# Patient Record
Sex: FEMALE | Race: AMERICAN INDIAN OR ALASKA NATIVE | NOT HISPANIC OR LATINO | Employment: UNEMPLOYED | URBAN - METROPOLITAN AREA
[De-identification: names, ages, dates, MRNs, and addresses within clinical notes are randomized per-mention and may not be internally consistent; named-entity substitution may affect disease eponyms.]

---

## 2018-11-08 ENCOUNTER — OFFICE VISIT (OUTPATIENT)
Dept: CARDIOLOGY | Facility: MEDICAL CENTER | Age: 48
End: 2018-11-08
Payer: MEDICAID

## 2018-11-08 VITALS
HEART RATE: 114 BPM | BODY MASS INDEX: 28.32 KG/M2 | HEIGHT: 65 IN | DIASTOLIC BLOOD PRESSURE: 70 MMHG | SYSTOLIC BLOOD PRESSURE: 118 MMHG | OXYGEN SATURATION: 98 % | WEIGHT: 170 LBS

## 2018-11-08 DIAGNOSIS — R06.02 SHORTNESS OF BREATH: ICD-10-CM

## 2018-11-08 DIAGNOSIS — I50.9 CONGESTIVE HEART FAILURE, UNSPECIFIED HF CHRONICITY, UNSPECIFIED HEART FAILURE TYPE (HCC): ICD-10-CM

## 2018-11-08 DIAGNOSIS — I10 ESSENTIAL HYPERTENSION, BENIGN: ICD-10-CM

## 2018-11-08 DIAGNOSIS — I50.20 ACC/AHA STAGE C SYSTOLIC HEART FAILURE (HCC): ICD-10-CM

## 2018-11-08 LAB — EKG IMPRESSION: NORMAL

## 2018-11-08 PROCEDURE — 99204 OFFICE O/P NEW MOD 45 MIN: CPT | Mod: 25 | Performed by: INTERNAL MEDICINE

## 2018-11-08 PROCEDURE — 93000 ELECTROCARDIOGRAM COMPLETE: CPT | Performed by: INTERNAL MEDICINE

## 2018-11-08 RX ORDER — METOPROLOL SUCCINATE 50 MG/1
50 TABLET, EXTENDED RELEASE ORAL DAILY
COMMUNITY
End: 2018-11-14 | Stop reason: CLARIF

## 2018-11-08 RX ORDER — FUROSEMIDE 40 MG/1
20 TABLET ORAL DAILY
COMMUNITY
Start: 2018-10-30 | End: 2019-01-25 | Stop reason: SDUPTHER

## 2018-11-08 RX ORDER — METOPROLOL SUCCINATE 50 MG/1
75 TABLET, EXTENDED RELEASE ORAL DAILY
Qty: 45 TAB | Refills: 11 | Status: SHIPPED | OUTPATIENT
Start: 2018-11-08 | End: 2018-11-14 | Stop reason: CLARIF

## 2018-11-08 RX ORDER — POTASSIUM CHLORIDE 750 MG/1
10 TABLET, EXTENDED RELEASE ORAL 2 TIMES DAILY
COMMUNITY
Start: 2018-10-31 | End: 2018-11-08

## 2018-11-08 RX ORDER — LISINOPRIL 10 MG/1
10 TABLET ORAL DAILY
COMMUNITY
Start: 2018-10-30

## 2018-11-08 RX ORDER — SPIRONOLACTONE 25 MG/1
12.5 TABLET ORAL DAILY
Qty: 15 TAB | Refills: 11 | Status: SHIPPED | OUTPATIENT
Start: 2018-11-08 | End: 2019-06-17 | Stop reason: SDUPTHER

## 2018-11-08 ASSESSMENT — ENCOUNTER SYMPTOMS
LOSS OF CONSCIOUSNESS: 0
DEPRESSION: 0
SPEECH CHANGE: 0
NERVOUS/ANXIOUS: 0
MYALGIAS: 0
SHORTNESS OF BREATH: 1
VOMITING: 0
BRUISES/BLEEDS EASILY: 0
CHILLS: 0
WHEEZING: 0
ABDOMINAL PAIN: 0
EYE PAIN: 0
ORTHOPNEA: 1
FEVER: 0
PALPITATIONS: 1
BLURRED VISION: 0
NAUSEA: 0
EYE DISCHARGE: 0
HEMOPTYSIS: 0
PND: 1
COUGH: 1

## 2018-11-08 ASSESSMENT — MINNESOTA LIVING WITH HEART FAILURE QUESTIONNAIRE (MLHF)
EATING LESS FOODS YOU LIKE: 2
MAKING YOU FEEL DEPRESSED: 1
TIRED, FATIGUED OR LOW ON ENERGY: 3
DIFFICULTY WITH RECREATIONAL PASTIMES, SPORTS, HOBBIES: 5
DIFFICULTY SLEEPING WELL AT NIGHT: 1
TOTAL_SCORE: 51
DIFFICULTY SOCIALIZING WITH FAMILY OR FRIENDS: 1
DIFFICULTY WORKING TO EARN A LIVING: 4
MAKING YOU STAY IN A HOSPITAL: 4
WORKING AROUND THE HOUSE OR YARD DIFFICULT: 1
DIFFICULTY GOING AWAY FROM HOME: 2
DIFFICULTY WITH SEXUAL ACTIVITIES: 0
GIVING YOU SIDE EFFECTS FROM TREATMENTS: 1
LOSS OF SELF CONTROL IN YOUR LIFE: 2
SWELLING IN ANKLES OR LEGS: 1
COSTING YOU MONEY FOR MEDICAL CARE: 3
HAVING TO SIT OR LIE DOWN DURING THE DAY: 3
FEELING LIKE A BURDEN TO FAMILY AND FRIENDS: 2
DIFFICULTY TO CONCENTRATE OR REMEMBERING THINGS: 5
MAKING YOU SHORT OF BREATH: 3
WALKING ABOUT OR CLIMBING STAIRS DIFFICULT: 2
MAKING YOU WORRY: 5

## 2018-11-08 ASSESSMENT — 6 MINUTE WALK TEST (6MWT): TOTAL DISTANCE WALKED (METERS): 128

## 2018-11-08 NOTE — PROGRESS NOTES
"Education Narrative  Reviewed anatomy and physiology of heart failure with patient and mother. Went over heart failure worksheet and patient's individual HF diagnosis, EF, risk factors, general medication classes and indications, as well as personal goals.  Goals: Patient's primary goal is increase activity levels and abstain  From methamphetamine use which she states she stopped 3 weeks ago.      Discussed daily weights, sodium restriction, worsening signs and symptoms to report to physician, heart medications, and importance of adherence to medication regimen. Emphasized recommendation from AHA/AAHFN to keep daily sodium intake between 1500mg-2000mg.      Reviewed dietary handouts, advanced care planning classes, and advance directive planning handout. Discussed dietary considerations and reviewed Seven Day Heart Healthy Meal Plan by [employer].     Invited patient and family members/friends to HF support group and encouraged patient to call Heart Failure clinic during normal business hours with any questions.  Heart Failure program card with number given to patient.           Patient states full understanding of all information given.     OP Heart Failure  Vitals     Weight: 77.1 kg (170 lb)        Height: 165.1 cm (5' 5\")  BMI (Calculated): 28.29  Blood Pressure: 118/70  Pulse: (!) 114    System Assessment      Smoking Hx  Have you Ever Smoked: Never       Alcohol Hx  Do you Drink?: No          Illicit Drug Hx  Illicit Drug History: Yes    Social Hx  Social History: Lives with Other  Level of Support: Good  Advance Directives: None, Began discussion, Paperwork provided    Education  Symptoms: Verbalizes understanding  Weighing: Verbalizes Understanding  Weight Gain Response: Verbalizes Understanding   Recording Data: Verbalizes understanding  Teach Back Failures: Teach Back Successful  Compliance: Patient is Compliant       Medications  Medication Reconciliation : Complete  Medication Counseling Provided: " "Verbalizes Understanding  Able to Accurately Identify Medication Indications: Yes  Medication Discrepancies: None  Is Patient on an Evidence Based Beta Blocker: Yes  Is Patient on ACE-1 or ARB: Yes    Dietary Assessment  Food Labels: Verbalizes Understanding  Foods High in Sodium: Verbalizes Understanding  Daily Sodium Intake: Verbalizes Understanding  Diet: Verbalizes Understanding  Food Preparation: Verbalizes Understanding  Eating Out Plan: Verbalizes understanding  Healthier Options: Verbalizes Understanding  Fluid Restriction: Not Applicable    MN Living with Heart Failure  Swelling in Ankles or Legs: 1  Having to Sit or Lie Down During the Day: 3  Walking About or Climbing Stairs Difficult: 2  Working Around the House or Yard Difficult: 1  Difficulty Going Away from Home: 2  Difficulty Sleeping Well at Night: 1  Difficulty Socializing with Family or Friends: 1  Difficulty Working to Earn a Livin  Difficulty with Recreational Pastimes, Sports, Hobbies: 5  Difficulty with Sexual Activities: 0  Eating Less Foods You Like: 2  Making you Short of Breath: 3  Tired, Fatigued or Low on Energy: 3  Making you Stay in a Hospital: 4  Costing you Money for Medical Care?: 3  Giving you Side Effects from Treatments: 1  Feeling like a [place] to Family and Friends: 2  Loss of Self Control in your Life: 2  Making You Worry: 5  Difficulty to Concentrate or Remembering Things: 5  Making you Feel Depressed: 1  MLHF Total Score : 51    6 Minute Walk Test  Baseline to end of test: 3M  Total meters walked: 128  Comments: Pt felt SOB and there was \"tingling\" in her legs                    "

## 2018-11-08 NOTE — PATIENT INSTRUCTIONS
Increase metoprolol ER to 50 mg 1-1/2 tab daily.  Start Spironolactone 25 mg one half tab daily  Reduce furosemide to 40 mg one half tab daily.  Increase this back to 40 mg daily for any increasing swelling or dyspnea    Follow-up next week with lab work 2 days prior.

## 2018-11-08 NOTE — LETTER
[facility]   [address] [place] [address]  J[place] Cerna [address]  Phone: [phone]  Fax: [phone]              Lisa Talley  1970    Encounter Date: 11/8/2018    Rafael Ernandez M.D.          PROGRESS NOTE:  Chief Complaint   Patient presents with   • Congestive Heart Failure     NEW HEART FAILURE       Subjective:   Lisa Talley is a 48 y.o. female who presents today for a new patient evaluation because of increasing dyspnea.  She was hospitalized last week for several days and was given the diagnosis of congestive heart failure.    Over the last couple of months she has had progressive dyspnea.  Ultimately she had difficulty with PND, orthopnea, edema and diaphoresis.  She also had dyspnea on minimal exertion.  She ultimately called the paramedics and was taken to [facility] in [place].  There she received IV diuresis.  A chest x-ray an echocardiogram were obtained.  The morning of admission she did have some chest discomfort.  This discomfort was in the left upper parasternal region above her left breast.  This discomfort lasted a couple of hours and was relieved when she was in the hospital.  She describes it as a dull ache which she rates as 2/10.  It did not radiate and was not associated with any nausea.  She already was dyspneic and had diaphoresis.    She continues have difficulty with dyspnea on exertion at about a half a block.  Her PND and orthopnea have resolved.  She has noted no recurrent edema.  Before her treatment she was having difficulty with palpitations.  She states her heart would beat fast and hard and she have to stop and take some breaths.  She has had some intermittent lightheadedness.    Past Medical History:   Diagnosis Date   • Hypertension      Past Surgical History:   Procedure Laterality Date   • EMILIANO BY LAPAROSCOPY     • TUBAL COAGULATION LAPAROSCOPIC BILATERAL       Family History   Problem Relation Age of Onset     • Heart Attack Neg Hx      Social History     Social History   • Marital status: Single     Spouse name: N/A   • Number of children: N/A   • Years of education: N/A     Occupational History   • Not on file.     Social History Main Topics   • Smoking status: Never Smoker   • Smokeless tobacco: Never Used   • Alcohol use No   • Drug use: No   • Sexual activity: Not on file     Other Topics Concern   • Not on file     Social History Narrative   • No narrative on file     No Known Allergies  Outpatient Encounter Prescriptions as of 11/8/2018   Medication Sig Dispense Refill   • potassium chloride SA (K-DUR) 10 MEQ Tab CR Take 10 mEq by mouth 2 times a day.     • lisinopril (PRINIVIL) 10 MG Tab Take 10 mg by mouth every day.     • furosemide (LASIX) 40 MG Tab Take 40 mg by mouth every day.     • metoprolol SR (TOPROL XL) 50 MG TABLET SR 24 HR Take 50 mg by mouth every day.       No facility-administered encounter medications on file as of 11/8/2018.      Review of Systems   Constitutional: Negative for chills and fever.   HENT: Negative for congestion.    Eyes: Negative for blurred vision, pain and discharge.   Respiratory: Positive for cough (She has had a cough productive of clear to greenish sputum over the last couple of months.) and shortness of breath. Negative for hemoptysis and wheezing.    Cardiovascular: Positive for palpitations, orthopnea, leg swelling and PND.   Gastrointestinal: Negative for abdominal pain, nausea and vomiting.   Musculoskeletal: Negative for joint pain and myalgias.   Skin: Positive for itching. Negative for rash.   Neurological: Negative for speech change and loss of consciousness.   Endo/Heme/Allergies: Does not bruise/bleed easily.   Psychiatric/Behavioral: Negative for depression. The patient is not nervous/anxious.    All other systems reviewed and are negative.       Objective:   /70 (BP Location: Left arm, Patient Position: Sitting, BP Cuff Size: Adult)   Pulse (!) 114    "Ht 1.651 m (5' 5\")   Wt 77.1 kg (170 lb)   SpO2 98%   BMI 28.29 kg/m²      Physical Exam   Constitutional: She is oriented to person, place, and time. She appears well-developed and well-nourished. No distress.   HENT:   Head: Normocephalic and atraumatic.   Mouth/Throat: Mucous membranes are normal.   Neck: JVD present. No thyromegaly present.   Cardiovascular: Normal rate, regular rhythm and intact distal pulses.  Exam reveals gallop.    No murmur heard.  Pulmonary/Chest: Effort normal and breath sounds normal. She has no rales.   Abdominal: Soft. There is no splenomegaly or hepatomegaly.   Musculoskeletal: Normal range of motion. She exhibits no edema.   Lymphadenopathy:     She has no cervical adenopathy.   Neurological: She is alert and oriented to person, place, and time. She has normal strength. She exhibits normal muscle tone.   Skin: Skin is warm and dry. No rash noted.   Psychiatric: She has a normal mood and affect. Her behavior is normal.     EKG from today: This showed a normal sinus rhythm with LVH by voltage and associated repolarization abnormality.  She also has probable right atrial enlargement.  QS pattern is noted in V1 and V2 probably due to LVH.    Assessment:     1. Congestive heart failure, unspecified HF chronicity, unspecified heart failure type (HCC)  EKG   2. Shortness of breath     3. Essential hypertension, benign         Medical Decision Making:  Today's Assessment / Status / Plan:     Ms. Talley is having difficulty with congestive heart failure.  The exact etiology is not entirely clear but it may be hypertensive.  She had evaluation at [facility] which included an echocardiogram.  I would like to obtain that for review.  At the present time we will try to gradually increase her medical therapy.  She is scheduled for a myocardial perfusion scan in in [place] next week..        At this time, we will increase metoprolol ER to 75 mg daily.  We will start her on " "low-dose spironolactone 12.5 mg daily.  She will follow-up at the end of the next week and have a basic metabolic panel prior.  We will try reducing furosemide to 20 mg daily.  If she has any difficulty with edema or dyspnea she can go back to 40 mg.  She does appear to be euvolemic at the present time.    Education Narrative  Reviewed anatomy and physiology of heart failure with patient and mother. Went over heart failure worksheet and patient's individual HF diagnosis, EF, risk factors, general medication classes and indications, as well as personal goals.  Goals: Patient's primary goal is increase activity levels and abstain  From methamphetamine use which she states she stopped 3 weeks ago.      Discussed daily weights, sodium restriction, worsening signs and symptoms to report to physician, heart medications, and importance of adherence to medication regimen. Emphasized recommendation from AHA/AAHFN to keep daily sodium intake between 1500mg-2000mg.      Reviewed dietary handouts, advanced care planning classes, and advance directive planning handout. Discussed dietary considerations and reviewed Seven Day Heart Healthy Meal Plan by [employer].     Invited patient and family members/friends to HF support group and encouraged patient to call Heart Failure clinic during normal business hours with any questions.  Heart Failure program card with number given to patient.           Patient states full understanding of all information given.     OP Heart Failure  Vitals     Weight: 77.1 kg (170 lb)        Height: 165.1 cm (5' 5\")  BMI (Calculated): 28.29  Blood Pressure: 118/70  Pulse: (!) 114    System Assessment      Smoking Hx  Have you Ever Smoked: Never       Alcohol Hx  Do you Drink?: No          Illicit Drug Hx  Illicit Drug History: Yes    Social Hx  Social History: Lives with Other  Level of Support: Good  Advance Directives: None, Began discussion, Paperwork provided    Education  Symptoms: Verbalizes " "understanding  Weighing: Verbalizes Understanding  Weight Gain Response: Verbalizes Understanding   Recording Data: Verbalizes understanding  Teach Back Failures: Teach Back Successful  Compliance: Patient is Compliant       Medications  Medication Reconciliation : Complete  Medication Counseling Provided: Verbalizes Understanding  Able to Accurately Identify Medication Indications: Yes  Medication Discrepancies: None  Is Patient on an Evidence Based Beta Blocker: Yes  Is Patient on ACE-1 or ARB: Yes    Dietary Assessment  Food Labels: Verbalizes Understanding  Foods High in Sodium: Verbalizes Understanding  Daily Sodium Intake: Verbalizes Understanding  Diet: Verbalizes Understanding  Food Preparation: Verbalizes Understanding  Eating Out Plan: Verbalizes understanding  Healthier Options: Verbalizes Understanding  Fluid Restriction: Not Applicable    MN Living with Heart Failure  Swelling in Ankles or Legs: 1  Having to Sit or Lie Down During the Day: 3  Walking About or Climbing Stairs Difficult: 2  Working Around the House or Yard Difficult: 1  Difficulty Going Away from Home: 2  Difficulty Sleeping Well at Night: 1  Difficulty Socializing with Family or Friends: 1  Difficulty Working to Earn a Livin  Difficulty with Recreational Pastimes, Sports, Hobbies: 5  Difficulty with Sexual Activities: 0  Eating Less Foods You Like: 2  Making you Short of Breath: 3  Tired, Fatigued or Low on Energy: 3  Making you Stay in a Hospital: 4  Costing you Money for Medical Care?: 3  Giving you Side Effects from Treatments: 1  Feeling like a [place] to Family and Friends: 2  Loss of Self Control in your Life: 2  Making You Worry: 5  Difficulty to Concentrate or Remembering Things: 5  Making you Feel Depressed: 1  MLHF Total Score : 51    6 Minute Walk Test  Baseline to end of test: 3M  Total meters walked: 128  Comments: Pt felt SOB and there was \"tingling\" in her legs                        Regla Godfrey, F.N.P.  [address] [place] [address]  VIA Facsimile: [phone]

## 2018-11-08 NOTE — PROGRESS NOTES
Chief Complaint   Patient presents with   • Congestive Heart Failure     NEW HEART FAILURE       Subjective:   Lisa Talley is a 48 y.o. female who presents today for a new patient evaluation because of increasing dyspnea.  She was hospitalized last week for several days and was given the diagnosis of congestive heart failure. She does have a history of methamphetamine use and quit this habit a few weeks ago.    Over the last couple of months she has had progressive dyspnea.  Ultimately she had difficulty with PND, orthopnea, edema and diaphoresis.  She also had dyspnea on minimal exertion.  She ultimately called the paramedics and was taken to [facility] in [place].  There she received IV diuresis.  A chest x-ray an echocardiogram were obtained.  The morning of admission she did have some chest discomfort.  This discomfort was in the left upper parasternal region above her left breast.  This discomfort lasted a couple of hours and was relieved when she was in the hospital.  She describes it as a dull ache which she rates as 2/10.  It did not radiate and was not associated with any nausea.  She already was dyspneic and had diaphoresis.    She continues have difficulty with dyspnea on exertion at about a half a block.  Her PND and orthopnea have resolved.  She has noted no recurrent edema.  Before her treatment she was having difficulty with palpitations.  She states her heart would beat fast and hard and she have to stop and take some breaths.  She has had some intermittent lightheadedness.    Past Medical History:   Diagnosis Date   • Hypertension      Past Surgical History:   Procedure Laterality Date   • EMILIANO BY LAPAROSCOPY     • TUBAL COAGULATION LAPAROSCOPIC BILATERAL       Family History   Problem Relation Age of Onset   • Heart Attack Neg Hx      Social History     Social History   • Marital status: Single     Spouse name: N/A   • Number of children: N/A   • Years of education: N/A  "    Occupational History   • Not on file.     Social History Main Topics   • Smoking status: Never Smoker   • Smokeless tobacco: Never Used   • Alcohol use No   • Drug use: No   • Sexual activity: Not on file     Other Topics Concern   • Not on file     Social History Narrative   • No narrative on file     No Known Allergies  Outpatient Encounter Prescriptions as of 11/8/2018   Medication Sig Dispense Refill   • potassium chloride SA (K-DUR) 10 MEQ Tab CR Take 10 mEq by mouth 2 times a day.     • lisinopril (PRINIVIL) 10 MG Tab Take 10 mg by mouth every day.     • furosemide (LASIX) 40 MG Tab Take 40 mg by mouth every day.     • metoprolol SR (TOPROL XL) 50 MG TABLET SR 24 HR Take 50 mg by mouth every day.       No facility-administered encounter medications on file as of 11/8/2018.      Review of Systems   Constitutional: Negative for chills and fever.   HENT: Negative for congestion.    Eyes: Negative for blurred vision, pain and discharge.   Respiratory: Positive for cough (She has had a cough productive of clear to greenish sputum over the last couple of months.) and shortness of breath. Negative for hemoptysis and wheezing.    Cardiovascular: Positive for palpitations, orthopnea, leg swelling and PND.   Gastrointestinal: Negative for abdominal pain, nausea and vomiting.   Musculoskeletal: Negative for joint pain and myalgias.   Skin: Positive for itching. Negative for rash.   Neurological: Negative for speech change and loss of consciousness.   Endo/Heme/Allergies: Does not bruise/bleed easily.   Psychiatric/Behavioral: Negative for depression. The patient is not nervous/anxious.    All other systems reviewed and are negative.       Objective:   /70 (BP Location: Left arm, Patient Position: Sitting, BP Cuff Size: Adult)   Pulse (!) 114   Ht 1.651 m (5' 5\")   Wt 77.1 kg (170 lb)   SpO2 98%   BMI 28.29 kg/m²     Physical Exam   Constitutional: She is oriented to person, place, and time. She appears " well-developed and well-nourished. No distress.   HENT:   Head: Normocephalic and atraumatic.   Mouth/Throat: Mucous membranes are normal.   Neck: JVD present. No thyromegaly present.   Cardiovascular: Normal rate, regular rhythm and intact distal pulses.  Exam reveals gallop.    No murmur heard.  Pulmonary/Chest: Effort normal and breath sounds normal. She has no rales.   Abdominal: Soft. There is no splenomegaly or hepatomegaly.   Musculoskeletal: Normal range of motion. She exhibits no edema.   Lymphadenopathy:     She has no cervical adenopathy.   Neurological: She is alert and oriented to person, place, and time. She has normal strength. She exhibits normal muscle tone.   Skin: Skin is warm and dry. No rash noted.   Psychiatric: She has a normal mood and affect. Her behavior is normal.     EKG from today: This showed a normal sinus rhythm with LVH by voltage and associated repolarization abnormality.  She also has probable right atrial enlargement.  QS pattern is noted in V1 and V2 probably due to LVH.    Assessment:     1. Congestive heart failure, unspecified HF chronicity, unspecified heart failure type (HCC)  EKG   2. Shortness of breath     3. Essential hypertension, benign         Medical Decision Making:  Today's Assessment / Status / Plan:     Ms. Talley is having difficulty with congestive heart failure.  The exact etiology is not entirely clear but it may be hypertensive or secondary to methamphetamine use.  She had evaluation at [facility] which included an echocardiogram.  I would like to obtain that for review.  At the present time we will try to gradually increase her medical therapy.  She is scheduled for a myocardial perfusion scan in in [place] next week..        At this time, we will increase metoprolol ER to 75 mg daily.  We will start her on low-dose spironolactone 12.5 mg daily.  She will follow-up at the end of the next week and have a basic metabolic panel prior.  We will  try reducing furosemide to 20 mg daily.  If she has any difficulty with edema or dyspnea she can go back to 40 mg.  She does appear to be euvolemic at the present time.

## 2018-11-09 ENCOUNTER — TELEPHONE (OUTPATIENT)
Dept: CARDIOLOGY | Facility: MEDICAL CENTER | Age: 48
End: 2018-11-09

## 2018-11-10 NOTE — TELEPHONE ENCOUNTER
PAR sent to plan:  Lisa Talley ([place]: [id number])       [facility]   [facility]   Next Steps   The plan will fax you a determination, typically within 1 to 5 business days.  How do I follow up?   DrugMetoprolol Succinate ER 50MG er tablets   Form[place] Medicaid General FormPrior Authorization Form for General [phone]

## 2018-11-12 NOTE — TELEPHONE ENCOUNTER
PAR denied for:  metoprolol SR (TOPROL XL) 50 MG TABLET SR 24 HR    Patient's plan requires trial and fail of two or more preferred alternatives    For patient's plan the preferred alternatives are all regular release beta blockers

## 2018-11-14 ENCOUNTER — TELEPHONE (OUTPATIENT)
Dept: CARDIOLOGY | Facility: MEDICAL CENTER | Age: 48
End: 2018-11-14

## 2018-11-14 RX ORDER — BISOPROLOL FUMARATE 5 MG/1
5 TABLET, FILM COATED ORAL DAILY
Qty: 30 TAB | Refills: 3 | Status: SHIPPED | OUTPATIENT
Start: 2018-11-14 | End: 2018-11-16 | Stop reason: CLARIF

## 2018-11-14 NOTE — TELEPHONE ENCOUNTER
Faxed medical release of info to [facility] in [place]. Per Dr. Ernandez note on 11/08/2018, patient was seen there.     [facility] P# [phone]  Fax# [phone]    Records will be faxed to [facility][id number]

## 2018-11-16 ENCOUNTER — OFFICE VISIT (OUTPATIENT)
Dept: CARDIOLOGY | Facility: MEDICAL CENTER | Age: 48
End: 2018-11-16
Payer: MEDICAID

## 2018-11-16 VITALS
OXYGEN SATURATION: 99 % | HEIGHT: 65 IN | WEIGHT: 178 LBS | HEART RATE: 78 BPM | SYSTOLIC BLOOD PRESSURE: 100 MMHG | DIASTOLIC BLOOD PRESSURE: 72 MMHG | BODY MASS INDEX: 29.66 KG/M2

## 2018-11-16 DIAGNOSIS — I10 ESSENTIAL HYPERTENSION, BENIGN: ICD-10-CM

## 2018-11-16 DIAGNOSIS — I50.9 HEART FAILURE, NYHA CLASS 2 (HCC): ICD-10-CM

## 2018-11-16 DIAGNOSIS — I50.20 ACC/AHA STAGE C SYSTOLIC HEART FAILURE (HCC): ICD-10-CM

## 2018-11-16 DIAGNOSIS — F15.10 METHAMPHETAMINE ABUSE (HCC): ICD-10-CM

## 2018-11-16 PROCEDURE — 99214 OFFICE O/P EST MOD 30 MIN: CPT | Performed by: NURSE PRACTITIONER

## 2018-11-16 RX ORDER — METOPROLOL SUCCINATE 100 MG/1
100 TABLET, EXTENDED RELEASE ORAL DAILY
Qty: 30 TAB | Refills: 11 | Status: SHIPPED | OUTPATIENT
Start: 2018-11-16 | End: 2019-08-14 | Stop reason: SDUPTHER

## 2018-11-16 ASSESSMENT — ENCOUNTER SYMPTOMS
ORTHOPNEA: 0
CLAUDICATION: 0
SHORTNESS OF BREATH: 1
DIZZINESS: 0
COUGH: 0
PALPITATIONS: 0
ABDOMINAL PAIN: 0
PND: 0
FEVER: 0
MYALGIAS: 0

## 2018-11-16 NOTE — PROGRESS NOTES
Chief Complaint   Patient presents with   • CHF (Systolic)     HF Est.        Subjective:   Lisa Talley is a 48 y.o. female who presents today for follow-up on her heart failure.    Patient was initially seen in the heart failure clinic on 11/8/2018.  Patient had a recent diagnosis of heart failure with an LVEF of 10-15% at [facility] in [place], [place] from 10/26/18-10/20/18.  Patient has a history of methamphetamine abuse and hypertension.  Patient reports she stopped using meth a few weeks ago when she was initially diagnosed.    During her last visit, metoprolol was increased to 75 mg daily, patient was started on spironolactone 12.5 mg daily and her furosemide was decreased to 20 mg daily.  Patient reports no problems with the medication changes.  Patient did have difficulties obtaining the medication through her insurance, her metoprolol was switched to bisoprolol, however, she ended up paying for her metoprolol XL.  She is only taking metoprolol XL not bisoprolol.    Patient completed her stress test on Wednesday.    Patient states she does have shortness of breath with ADLs and exertion and occasional chest pain.  She denies palpitations, orthopnea, PND, edema or dizziness/lightheadedness.    Her home weights have been around 177 pounds.    Past Medical History:   Diagnosis Date   • Hypertension      Past Surgical History:   Procedure Laterality Date   • EMILIANO BY LAPAROSCOPY     • TUBAL COAGULATION LAPAROSCOPIC BILATERAL       Family History   Problem Relation Age of Onset   • Diabetes Father    • Hyperlipidemia Father    • Heart Attack Neg Hx      Social History     Social History   • Marital status: Single     Spouse name: N/A   • Number of children: N/A   • Years of education: N/A     Occupational History   • Not on file.     Social History Main Topics   • Smoking status: Never Smoker   • Smokeless tobacco: Never Used   • Alcohol use No   • Drug use: Yes     Types: Methamphetamines   •  "Sexual activity: Not on file     Other Topics Concern   • Not on file     Social History Narrative   • No narrative on file     No Known Allergies  Outpatient Encounter Prescriptions as of 11/16/2018   Medication Sig Dispense Refill   • lisinopril (PRINIVIL) 10 MG Tab Take 10 mg by mouth every day.     • furosemide (LASIX) 40 MG Tab Take 40 mg by mouth every day.     • spironolactone (ALDACTONE) 25 MG Tab Take 0.5 Tabs by mouth every day. 15 Tab 11   • [DISCONTINUED] bisoprolol (ZEBETA) 5 MG Tab Take 1 Tab by mouth every day. 30 Tab 3     No facility-administered encounter medications on file as of 11/16/2018.      Review of Systems   Constitutional: Negative for fever and malaise/fatigue.   Respiratory: Positive for shortness of breath. Negative for cough.    Cardiovascular: Positive for chest pain. Negative for palpitations, orthopnea, claudication, leg swelling and PND.   Gastrointestinal: Negative for abdominal pain.   Musculoskeletal: Negative for myalgias.   Neurological: Negative for dizziness.   All other systems reviewed and are negative.       Objective:   /72 (BP Location: Left arm, Patient Position: Sitting, BP Cuff Size: Adult)   Pulse 78   Ht 1.651 m (5' 5\")   Wt 80.7 kg (178 lb)   SpO2 99%   BMI 29.62 kg/m²     Physical Exam   Constitutional: She is oriented to person, place, and time. She appears well-developed and well-nourished.   HENT:   Head: Normocephalic and atraumatic.   Eyes: Pupils are equal, round, and reactive to light. EOM are normal.   Neck: Normal range of motion. Neck supple. No JVD present.   Cardiovascular: Normal rate, regular rhythm and normal heart sounds.    Pulmonary/Chest: Effort normal and breath sounds normal. No respiratory distress. She has no wheezes. She has no rales.   Abdominal: Soft. Bowel sounds are normal.   Musculoskeletal: She exhibits no edema.   Neurological: She is alert and oriented to person, place, and time.   Skin: Skin is warm and dry. "   Psychiatric: She has a normal mood and affect. Her behavior is normal.   Vitals reviewed.    No results found for: CHOLSTRLTOT, LDL, HDL, TRIGLYCERIDE    No results found for: SODIUM, POTASSIUM, CHLORIDE, CO2, GLUCOSE, BUN, CREATININE, BUNCREATRAT, GLOMRATE  No results found for: ALKPHOSPHAT, ASTSGOT, ALTSGPT, TBILIRUBIN     Medical records and testing in media from [facility] in [place], [place].  Assessment:     1. ACC/AHA stage C systolic heart failure (HCC)  metoprolol SR (TOPROL XL) 100 MG TABLET SR 24 HR   2. Heart failure, NYHA class 2 (HCC)  metoprolol SR (TOPROL XL) 100 MG TABLET SR 24 HR   3. Essential hypertension, benign     4. Methamphetamine abuse ([facility])         Medical Decision Making:  Today's Assessment / Status / Plan:   1. HFrEF, Stage C, Class 2, LVEF 10-15%: Based on physical examination findings, patient is euvolemic. No JVD, lungs are clear to auscultation, no pitting edema in bilateral lower extremities, no ascites.  -Increase Toprol-XL to 100 mg daily  -Continue lisinopril 10 mg daily  -Continue spironolactone 12.5 mg daily  -Continue furosemide 20 mg daily  -Reinforced s/sx of worsening heart failure with patient and weight monitoring. Pt verbalizes understanding. Pt to call office or RTC if present.   -If EF remains less than 35%, we will consider ICD for primary prevention, to optimize his heart failure regimen  -We will obtain stress test results from [place], [place]    2.  Hypertension: Stable  -Continue recommendations per above    3.  Methamphetamine abuse:  -Encouraged patient to refrain from meth use    FU in clinic in 2 weeks. Sooner if needed.    Patient verbalizes understanding and agrees with the plan of care.     Collaborating MD: Raymond Bliss MD

## 2018-11-19 ENCOUNTER — TELEPHONE (OUTPATIENT)
Dept: CARDIOLOGY | Facility: MEDICAL CENTER | Age: 48
End: 2018-11-19

## 2018-11-19 NOTE — TELEPHONE ENCOUNTER
PAR sent to plan:  Lisa Talley (Powell: [id number])       [facility]   [facility]   Next Steps   The plan will fax you a determination, typically within 1 to 5 business days.  How do I follow up?   DrugMetoprolol Succinate ER 100MG er tablets   Form[place] Medicaid General FormPrior Authorization Form for General [phone]

## 2018-12-03 ENCOUNTER — OFFICE VISIT (OUTPATIENT)
Dept: CARDIOLOGY | Facility: MEDICAL CENTER | Age: 48
End: 2018-12-03
Payer: MEDICAID

## 2018-12-03 VITALS
SYSTOLIC BLOOD PRESSURE: 102 MMHG | HEART RATE: 64 BPM | DIASTOLIC BLOOD PRESSURE: 60 MMHG | BODY MASS INDEX: 31.16 KG/M2 | WEIGHT: 187 LBS | HEIGHT: 65 IN | OXYGEN SATURATION: 97 %

## 2018-12-03 DIAGNOSIS — I50.9 HEART FAILURE, NYHA CLASS 1 (HCC): ICD-10-CM

## 2018-12-03 DIAGNOSIS — F15.10 METHAMPHETAMINE ABUSE (HCC): ICD-10-CM

## 2018-12-03 DIAGNOSIS — I10 ESSENTIAL HYPERTENSION, BENIGN: ICD-10-CM

## 2018-12-03 DIAGNOSIS — I50.20 ACC/AHA STAGE C SYSTOLIC HEART FAILURE (HCC): ICD-10-CM

## 2018-12-03 PROCEDURE — 99214 OFFICE O/P EST MOD 30 MIN: CPT | Performed by: NURSE PRACTITIONER

## 2018-12-03 ASSESSMENT — ENCOUNTER SYMPTOMS
DIZZINESS: 1
FEVER: 0
CLAUDICATION: 0
PALPITATIONS: 0
ORTHOPNEA: 0
ABDOMINAL PAIN: 0
PND: 0
SHORTNESS OF BREATH: 0
COUGH: 0
MYALGIAS: 0

## 2018-12-03 NOTE — PROGRESS NOTES
Chief Complaint   Patient presents with   • CHF (Systolic)     F/V: 1 MO       Subjective:   Lisa Talley is a 48 y.o. female who presents today for follow-up on her heart failure.    Patient of the heart failure clinic.  Patient was last seen on 11/16/2018.  During her last visit, her metoprolol XL was increased to 100 mg daily.  Patient reports no problems with the dose increase.    For her symptoms, patient reports she is been feeling well.  Patient does report occasional episodes of dizziness if she gets up too quickly.  Patient reports it resolves quickly.  She denies chest pain, palpitation, orthopnea, PND, edema or any shortness of breath at rest, with ADLs and exertion.  She states she has not returned back to work but is doing lots of chores and is walking.    Patient reports her home weights did increase up to 187 pounds at home.  Patient reports she has been watching her diet.  Patient denies any lower extremity edema or increased shortness of breath with weight gain.    Patient continues to refrain from meth use.    Past Medical History:   Diagnosis Date   • Hypertension      Past Surgical History:   Procedure Laterality Date   • EMILIANO BY LAPAROSCOPY     • TUBAL COAGULATION LAPAROSCOPIC BILATERAL       Family History   Problem Relation Age of Onset   • Diabetes Father    • Hyperlipidemia Father    • Heart Attack Neg Hx      Social History     Social History   • Marital status: Single     Spouse name: N/A   • Number of children: N/A   • Years of education: N/A     Occupational History   • Not on file.     Social History Main Topics   • Smoking status: Never Smoker   • Smokeless tobacco: Never Used   • Alcohol use No   • Drug use: No   • Sexual activity: Not on file     Other Topics Concern   • Not on file     Social History Narrative   • No narrative on file     No Known Allergies  Outpatient Encounter Prescriptions as of 12/3/2018   Medication Sig Dispense Refill   • metoprolol SR (TOPROL XL) 100 MG  "TABLET SR 24 HR Take 1 Tab by mouth every day. 30 Tab 11   • lisinopril (PRINIVIL) 10 MG Tab Take 10 mg by mouth every day.     • furosemide (LASIX) 40 MG Tab Take 40 mg by mouth every day.     • spironolactone (ALDACTONE) 25 MG Tab Take 0.5 Tabs by mouth every day. 15 Tab 11     No facility-administered encounter medications on file as of 12/3/2018.      Review of Systems   Constitutional: Negative for fever and malaise/fatigue.   Respiratory: Negative for cough and shortness of breath.    Cardiovascular: Negative for chest pain, palpitations, orthopnea, claudication, leg swelling and PND.   Gastrointestinal: Negative for abdominal pain.   Musculoskeletal: Negative for myalgias.   Neurological: Positive for dizziness (if standing too quickly).   All other systems reviewed and are negative.       Objective:   /60 (BP Location: Right arm, Patient Position: Sitting, BP Cuff Size: Adult)   Pulse 64   Ht 1.651 m (5' 5\")   Wt 84.8 kg (187 lb)   SpO2 97%   BMI 31.12 kg/m²     Physical Exam   Constitutional: She is oriented to person, place, and time. She appears well-developed and well-nourished.   HENT:   Head: Normocephalic and atraumatic.   Eyes: Pupils are equal, round, and reactive to light. EOM are normal.   Neck: Normal range of motion. Neck supple. No JVD present.   Cardiovascular: Normal rate, regular rhythm and normal heart sounds.    Pulmonary/Chest: Effort normal and breath sounds normal. No respiratory distress. She has no wheezes. She has no rales.   Abdominal: Soft. Bowel sounds are normal.   Musculoskeletal: She exhibits no edema.   Neurological: She is alert and oriented to person, place, and time.   Skin: Skin is warm and dry.   Psychiatric: She has a normal mood and affect. Her behavior is normal.   Vitals reviewed.    No results found for: CHOLSTRLTOT, LDL, HDL, TRIGLYCERIDE    No results found for: SODIUM, POTASSIUM, CHLORIDE, CO2, GLUCOSE, BUN, CREATININE, BUNCREATRAT, GLOMRATE  No results " found for: ALKPHOSPHAT, ASTSGOT, ALTSGPT, TBILIRUBIN     Other labs/imaging in media  Assessment:     1. ACC/AHA stage C systolic heart failure (HCC)  BASIC METABOLIC PANEL   2. Heart failure, NYHA class 1 (HCC)  BASIC METABOLIC PANEL   3. Essential hypertension, benign     4. Methamphetamine abuse (HCC)         Medical Decision Making:  Today's Assessment / Status / Plan:   1. HFrEF, Stage C, Class 1, LVEF 10-15%: Based on physical examination findings, patient is euvolemic. No JVD, lungs are clear to auscultation, no pitting edema in bilateral lower extremities, no ascites.  -Continue Toprol- mg daily  -Continue lisinopril 10 mg daily  -Continue spironolactone 12.5 mg daily  -Decrease furosemide to 20 mg daily (pt reports she was taking 40 mg daily not 20 mg daily)  -Repeat BMP in 1 month  -Reinforced s/sx of worsening heart failure with patient and weight monitoring. Pt verbalizes understanding. Pt to call office or RTC if present.   -Discussed with patient if EF remains less than 35%, we will consider ICD for primary prevention, to optimize his heart failure regimen    2.  Hypertension: Stable  -Unable to increase her medications due to borderline low BP  -Continue recommendations per above    3.  Methamphetamine abuse:  -Encouraged patient to continue to refrain from meth use    FU in clinic in 1 month with Dr. Ernandez. Sooner if needed.    Patient verbalizes understanding and agrees with the plan of care.     Collaborating MD: Cedrick Lomas MD

## 2018-12-31 DIAGNOSIS — I50.20 ACC/AHA STAGE C SYSTOLIC HEART FAILURE (HCC): ICD-10-CM

## 2018-12-31 DIAGNOSIS — I50.9 HEART FAILURE, NYHA CLASS 1 (HCC): ICD-10-CM

## 2019-01-03 ENCOUNTER — OFFICE VISIT (OUTPATIENT)
Dept: CARDIOLOGY | Facility: MEDICAL CENTER | Age: 49
End: 2019-01-03
Payer: MEDICAID

## 2019-01-03 VITALS
HEART RATE: 62 BPM | WEIGHT: 197 LBS | OXYGEN SATURATION: 98 % | HEIGHT: 65 IN | DIASTOLIC BLOOD PRESSURE: 56 MMHG | SYSTOLIC BLOOD PRESSURE: 90 MMHG | BODY MASS INDEX: 32.82 KG/M2

## 2019-01-03 DIAGNOSIS — I50.20 NYHA CLASS 1 HEART FAILURE WITH REDUCED EJECTION FRACTION (HCC): ICD-10-CM

## 2019-01-03 DIAGNOSIS — I10 ESSENTIAL HYPERTENSION, BENIGN: ICD-10-CM

## 2019-01-03 DIAGNOSIS — I50.20 ACC/AHA STAGE C SYSTOLIC HEART FAILURE (HCC): ICD-10-CM

## 2019-01-03 PROBLEM — R06.02 SHORTNESS OF BREATH: Status: RESOLVED | Noted: 2018-11-08 | Resolved: 2019-01-03

## 2019-01-03 PROCEDURE — 99214 OFFICE O/P EST MOD 30 MIN: CPT | Performed by: INTERNAL MEDICINE

## 2019-01-03 NOTE — PROGRESS NOTES
Chief Complaint   Patient presents with   • Congestive Heart Failure     F/v: 4 WEEK/ LABS EPIC       Subjective:   Lisa Talley is a 48 y.o. female who presents today for follow-up of her congestive heart failure with a severely reduced ejection fraction.  She was hospitalized at the end of October in [place].  She was ultimately found to have an ejection fraction of about 15%.  She does have a history of methamphetamine use.    She has some orthostatic lightheadedness.  However, she denies any chest discomfort, dyspnea on exertion, PND, orthopnea or edema.  She has had no palpitations.    Past Medical History:   Diagnosis Date   • Hypertension      Past Surgical History:   Procedure Laterality Date   • EMILIANO BY LAPAROSCOPY     • TUBAL COAGULATION LAPAROSCOPIC BILATERAL       Family History   Problem Relation Age of Onset   • Diabetes Father    • Hyperlipidemia Father    • Heart Attack Neg Hx      Social History     Social History   • Marital status: Single     Spouse name: N/A   • Number of children: N/A   • Years of education: N/A     Occupational History   • Not on file.     Social History Main Topics   • Smoking status: Never Smoker   • Smokeless tobacco: Never Used   • Alcohol use No   • Drug use: No   • Sexual activity: Not on file     Other Topics Concern   • Not on file     Social History Narrative   • No narrative on file     No Known Allergies  Outpatient Encounter Prescriptions as of 1/3/2019   Medication Sig Dispense Refill   • metoprolol SR (TOPROL XL) 100 MG TABLET SR 24 HR Take 1 Tab by mouth every day. 30 Tab 11   • lisinopril (PRINIVIL) 10 MG Tab Take 10 mg by mouth every day.     • furosemide (LASIX) 40 MG Tab Take 20 mg by mouth every day.     • spironolactone (ALDACTONE) 25 MG Tab Take 0.5 Tabs by mouth every day. 15 Tab 11     No facility-administered encounter medications on file as of 1/3/2019.      ROS     Objective:   BP (!) 90/56 (BP Location: Left arm, Patient Position: Sitting, BP  "Cuff Size: Adult)   Pulse 62   Ht 1.651 m (5' 5\")   Wt 89.4 kg (197 lb)   SpO2 98%   BMI 32.78 kg/m²     Physical Exam   Constitutional: She appears well-developed and well-nourished.   Neck: No JVD present.   Cardiovascular: Normal rate and regular rhythm.    No murmur heard.  Pulmonary/Chest: Effort normal and breath sounds normal. She has no rales.   Abdominal: Soft. There is no tenderness.   Musculoskeletal: She exhibits no edema.     Laboratory from December 31: Sodium 138, potassium 3.8, BUN 25 and creatinine 0.71.    Assessment:     1. ACC/AHA stage C systolic heart failure (HCC): LVEF 15% on echocardiography in October 2018     2. Essential hypertension, benign     3. NYHA class 1 heart failure with reduced ejection fraction (HCC)         Medical Decision Making:  Today's Assessment / Status / Plan:     Ms. Talley is clinically stable.  Her functional status is improved significantly.  Her blood pressure is borderline low and she is probably on maximally tolerated medical therapy at the present time.  We will obtain an echocardiogram to see if her ejection fraction is improved above 35%.  If not, she will need to be referred for an AICD.  Given her improvement in functional status I am hopeful that it will be greater than 35%.  She will follow-up in about a month.  "

## 2019-01-03 NOTE — LETTER
[facility]   [address]  [place] [address]  Phone: [phone]  Fax: [phone]              Lisa Talley  1970    Encounter Date: 1/3/2019    Rafael Ernandez M.D.          PROGRESS NOTE:  Chief Complaint   Patient presents with   • Congestive Heart Failure     F/v: 4 WEEK/ LABS EPIC       Subjective:   Lisa Talley is a 48 y.o. female who presents today for follow-up of her congestive heart failure with a severely reduced ejection fraction.  She was hospitalized at the end of October in [place].  She was ultimately found to have an ejection fraction of about 15%.  She does have a history of methamphetamine use.    She has some orthostatic lightheadedness.  However, she denies any chest discomfort, dyspnea on exertion, PND, orthopnea or edema.  She has had no palpitations.    Past Medical History:   Diagnosis Date   • Hypertension      Past Surgical History:   Procedure Laterality Date   • EMILIANO BY LAPAROSCOPY     • TUBAL COAGULATION LAPAROSCOPIC BILATERAL       Family History   Problem Relation Age of Onset   • Diabetes Father    • Hyperlipidemia Father    • Heart Attack Neg Hx      Social History     Social History   • Marital status: Single     Spouse name: N/A   • Number of children: N/A   • Years of education: N/A     Occupational History   • Not on file.     Social History Main Topics   • Smoking status: Never Smoker   • Smokeless tobacco: Never Used   • Alcohol use No   • Drug use: No   • Sexual activity: Not on file     Other Topics Concern   • Not on file     Social History Narrative   • No narrative on file     No Known Allergies  Outpatient Encounter Prescriptions as of 1/3/2019   Medication Sig Dispense Refill   • metoprolol SR (TOPROL XL) 100 MG TABLET SR 24 HR Take 1 Tab by mouth every day. 30 Tab 11   • lisinopril (PRINIVIL) 10 MG Tab Take 10 mg by mouth every day.     • furosemide (LASIX) 40 MG Tab Take 20 mg by mouth every  "day.     • spironolactone (ALDACTONE) 25 MG Tab Take 0.5 Tabs by mouth every day. 15 Tab 11     No facility-administered encounter medications on file as of 1/3/2019.      ROS     Objective:   BP (!) 90/56 (BP Location: Left arm, Patient Position: Sitting, BP Cuff Size: Adult)   Pulse 62   Ht 1.651 m (5' 5\")   Wt 89.4 kg (197 lb)   SpO2 98%   BMI 32.78 kg/m²      Physical Exam   Constitutional: She appears well-developed and well-nourished.   Neck: No JVD present.   Cardiovascular: Normal rate and regular rhythm.    No murmur heard.  Pulmonary/Chest: Effort normal and breath sounds normal. She has no rales.   Abdominal: Soft. There is no tenderness.   Musculoskeletal: She exhibits no edema.     Laboratory from December 31: Sodium 138, potassium 3.8, BUN 25 and creatinine 0.71.    Assessment:     1. ACC/AHA stage C systolic heart failure (HCC): LVEF 15% on echocardiography in October 2018     2. Essential hypertension, benign     3. NYHA class 1 heart failure with reduced ejection fraction (HCC)         Medical Decision Making:  Today's Assessment / Status / Plan:     Ms. Talley is clinically stable.  Her functional status is improved significantly.  Her blood pressure is borderline low and she is probably on maximally tolerated medical therapy at the present time.  We will obtain an echocardiogram to see if her ejection fraction is improved above 35%.  If not, she will need to be referred for an AICD.  Given her improvement in functional status I am hopeful that it will be greater than 35%.  She will follow-up in about a month.      Regla Godfrey, F.N.P.  [address] [place] [address]  VIA Facsimile: [phone]                 "

## 2019-01-09 ENCOUNTER — HOSPITAL ENCOUNTER (OUTPATIENT)
Dept: CARDIOLOGY | Facility: MEDICAL CENTER | Age: 49
End: 2019-01-09
Attending: INTERNAL MEDICINE
Payer: MEDICAID

## 2019-01-09 DIAGNOSIS — I10 ESSENTIAL HYPERTENSION, BENIGN: ICD-10-CM

## 2019-01-09 DIAGNOSIS — I50.20 ACC/AHA STAGE C SYSTOLIC HEART FAILURE (HCC): ICD-10-CM

## 2019-01-09 PROCEDURE — 93306 TTE W/DOPPLER COMPLETE: CPT | Mod: 26 | Performed by: INTERNAL MEDICINE

## 2019-01-09 PROCEDURE — 93306 TTE W/DOPPLER COMPLETE: CPT

## 2019-01-10 LAB
LV EJECT FRACT  99904: 50
LV EJECT FRACT MOD 2C 99903: 59.31
LV EJECT FRACT MOD 4C 99902: 51.9
LV EJECT FRACT MOD BP 99901: 55.64

## 2019-01-28 RX ORDER — FUROSEMIDE 40 MG/1
TABLET ORAL
Qty: 90 TAB | Refills: 3 | Status: SHIPPED | OUTPATIENT
Start: 2019-01-28 | End: 2019-03-22

## 2019-03-22 ENCOUNTER — OFFICE VISIT (OUTPATIENT)
Dept: CARDIOLOGY | Facility: MEDICAL CENTER | Age: 49
End: 2019-03-22
Payer: MEDICAID

## 2019-03-22 ENCOUNTER — TELEPHONE (OUTPATIENT)
Dept: CARDIOLOGY | Facility: MEDICAL CENTER | Age: 49
End: 2019-03-22

## 2019-03-22 VITALS
WEIGHT: 197 LBS | HEIGHT: 66 IN | DIASTOLIC BLOOD PRESSURE: 60 MMHG | HEART RATE: 80 BPM | SYSTOLIC BLOOD PRESSURE: 100 MMHG | OXYGEN SATURATION: 94 % | BODY MASS INDEX: 31.66 KG/M2

## 2019-03-22 DIAGNOSIS — I50.20 ACC/AHA STAGE C SYSTOLIC HEART FAILURE (HCC): ICD-10-CM

## 2019-03-22 DIAGNOSIS — I50.20 NYHA CLASS 1 HEART FAILURE WITH REDUCED EJECTION FRACTION (HCC): ICD-10-CM

## 2019-03-22 DIAGNOSIS — I10 ESSENTIAL HYPERTENSION, BENIGN: ICD-10-CM

## 2019-03-22 PROCEDURE — 99214 OFFICE O/P EST MOD 30 MIN: CPT | Performed by: INTERNAL MEDICINE

## 2019-03-22 RX ORDER — FUROSEMIDE 20 MG/1
20 TABLET ORAL DAILY
Qty: 30 TAB | Refills: 11 | Status: SHIPPED | OUTPATIENT
Start: 2019-03-22 | End: 2019-08-07 | Stop reason: SDUPTHER

## 2019-03-22 ASSESSMENT — MINNESOTA LIVING WITH HEART FAILURE QUESTIONNAIRE (MLHF)
DIFFICULTY SLEEPING WELL AT NIGHT: 1
DIFFICULTY SOCIALIZING WITH FAMILY OR FRIENDS: 2
DIFFICULTY GOING AWAY FROM HOME: 1
TOTAL_SCORE: 32
EATING LESS FOODS YOU LIKE: 1
MAKING YOU WORRY: 4
DIFFICULTY WITH RECREATIONAL PASTIMES, SPORTS, HOBBIES: 2
SWELLING IN ANKLES OR LEGS: 0
MAKING YOU FEEL DEPRESSED: 2
GIVING YOU SIDE EFFECTS FROM TREATMENTS: 1
MAKING YOU SHORT OF BREATH: 1
LOSS OF SELF CONTROL IN YOUR LIFE: 1
COSTING YOU MONEY FOR MEDICAL CARE: 1
WORKING AROUND THE HOUSE OR YARD DIFFICULT: 3
FEELING LIKE A BURDEN TO FAMILY AND FRIENDS: 1
DIFFICULTY WORKING TO EARN A LIVING: 2
MAKING YOU STAY IN A HOSPITAL: 1
DIFFICULTY WITH SEXUAL ACTIVITIES: 1
DIFFICULTY TO CONCENTRATE OR REMEMBERING THINGS: 2
HAVING TO SIT OR LIE DOWN DURING THE DAY: 2
TIRED, FATIGUED OR LOW ON ENERGY: 2
WALKING ABOUT OR CLIMBING STAIRS DIFFICULT: 1

## 2019-03-22 ASSESSMENT — 6 MINUTE WALK TEST (6MWT): TOTAL DISTANCE WALKED (METERS): 260

## 2019-03-22 NOTE — PROGRESS NOTES
Chief Complaint   Patient presents with   • CHF (Chronic)       Subjective:   Lisa Talley is a 48 y.o. female who presents today for follow-up of her congestive heart failure with a severely reduced ejection fraction.  She was hospitalized at the end of October in [place].  She was ultimately found to have an ejection fraction of about 15%.  She does have a history of methamphetamine use.    She has noted dyspnea on exertion about a block.  She has had no PND orthopnea.  She denies any edema, palpitations or chest discomfort.    She has not been getting much in the way of regular exercise since the onset of the cold weather.    Past Medical History:   Diagnosis Date   • Hypertension      Past Surgical History:   Procedure Laterality Date   • EMILIANO BY LAPAROSCOPY     • TUBAL COAGULATION LAPAROSCOPIC BILATERAL       Family History   Problem Relation Age of Onset   • Diabetes Father    • Hyperlipidemia Father    • Heart Attack Neg Hx      Social History     Social History   • Marital status: Single     Spouse name: N/A   • Number of children: N/A   • Years of education: N/A     Occupational History   • Not on file.     Social History Main Topics   • Smoking status: Never Smoker   • Smokeless tobacco: Never Used   • Alcohol use No   • Drug use: No   • Sexual activity: Not on file     Other Topics Concern   • Not on file     Social History Narrative   • No narrative on file     No Known Allergies  Outpatient Encounter Prescriptions as of 3/22/2019   Medication Sig Dispense Refill   • furosemide (LASIX) 40 MG Tab TAKE ONE TABLET EVERY MORNING 90 Tab 3   • metoprolol SR (TOPROL XL) 100 MG TABLET SR 24 HR Take 1 Tab by mouth every day. 30 Tab 11   • lisinopril (PRINIVIL) 10 MG Tab Take 10 mg by mouth every day.     • spironolactone (ALDACTONE) 25 MG Tab Take 0.5 Tabs by mouth every day. 15 Tab 11     No facility-administered encounter medications on file as of 3/22/2019.      ROS       Objective:   /60 (BP  "Location: Left arm, Patient Position: Sitting, BP Cuff Size: Adult)   Pulse 80   Ht 1.676 m (5' 6\")   Wt 89.4 kg (197 lb)   SpO2 94%   BMI 31.80 kg/m²     Physical Exam   Constitutional: She appears well-developed and well-nourished.   Neck: No JVD present.   Cardiovascular: Normal rate and regular rhythm.    No murmur heard.  Pulmonary/Chest: Effort normal and breath sounds normal. She has no rales.   Abdominal: Soft. There is no tenderness.   Musculoskeletal: She exhibits no edema.     Laboratory from December 31: Sodium 138, potassium 3.8, BUN 25 and creatinine 0.71.    Transthoracic  Echo Report      Echocardiography Laboratory    CONCLUSIONS  No prior study is available for comparison.   Left ventricle is mildly dilated. Normal left ventricular wall   thickness. Low normal to mildly reduced left ventricular systolic   function. Left ventricular ejection fraction is visually estimated to   be 50 %. Poor endocardial definition in multiple views. Normal   diastolic function.    NICKIE TALLEY  Exam Date:         01/09/2019     Assessment:     1. ACC/AHA stage C systolic heart failure (HCC): LVEF 15% on echocardiography in October 2018     2. NYHA class 1 heart failure with reduced ejection fraction (HCC)     3. Essential hypertension, benign         Medical Decision Making:  Today's Assessment / Status / Plan:     Ms. Talley is clinically stable.  She has had significant improvement in her left ventricular function and her diastolic function is normal.  I feel that her dyspnea is probably due to poor physical conditioning.  We discussed a low level walking program.  She continues to refrain from methamphetamine use.  She will reduce her furosemide dosage to 20 mg daily.  She will follow-up in about 3 months with a BMP.  "

## 2019-03-22 NOTE — TELEPHONE ENCOUNTER
Pt called x 2 about disability paperwork. One person answers and says she is not home and is in [place] today. The other 2 numbers, one has been disconnected and the other goes to  that does not belong to patient.

## 2019-03-22 NOTE — LETTER
[facility]   [address], [address]  [place] [address]  Phone: [phone]  Fax: [phone]              Lisa Talley  1970    Encounter Date: 3/22/2019    Rafael Ernandez M.D.          PROGRESS NOTE:  Chief Complaint   Patient presents with   • CHF (Chronic)       Subjective:   Lisa Talley is a 48 y.o. female who presents today for follow-up of her congestive heart failure with a severely reduced ejection fraction.  She was hospitalized at the end of October in [place].  She was ultimately found to have an ejection fraction of about 15%.  She does have a history of methamphetamine use.    She has noted dyspnea on exertion about a block.  She has had no PND orthopnea.  She denies any edema, palpitations or chest discomfort.    She has not been getting much in the way of regular exercise since the onset of the cold weather.    Past Medical History:   Diagnosis Date   • Hypertension      Past Surgical History:   Procedure Laterality Date   • EMILIANO BY LAPAROSCOPY     • TUBAL COAGULATION LAPAROSCOPIC BILATERAL       Family History   Problem Relation Age of Onset   • Diabetes Father    • Hyperlipidemia Father    • Heart Attack Neg Hx      Social History     Social History   • Marital status: Single     Spouse name: N/A   • Number of children: N/A   • Years of education: N/A     Occupational History   • Not on file.     Social History Main Topics   • Smoking status: Never Smoker   • Smokeless tobacco: Never Used   • Alcohol use No   • Drug use: No   • Sexual activity: Not on file     Other Topics Concern   • Not on file     Social History Narrative   • No narrative on file     No Known Allergies  Outpatient Encounter Prescriptions as of 3/22/2019   Medication Sig Dispense Refill   • furosemide (LASIX) 40 MG Tab TAKE ONE TABLET EVERY MORNING 90 Tab 3   • metoprolol SR (TOPROL XL) 100 MG TABLET SR 24 HR Take 1 Tab by mouth every day. 30 Tab 11   • lisinopril  "(PRINIVIL) 10 MG Tab Take 10 mg by mouth every day.     • spironolactone (ALDACTONE) 25 MG Tab Take 0.5 Tabs by mouth every day. 15 Tab 11     No facility-administered encounter medications on file as of 3/22/2019.      ROS       Objective:   /60 (BP Location: Left arm, Patient Position: Sitting, BP Cuff Size: Adult)   Pulse 80   Ht 1.676 m (5' 6\")   Wt 89.4 kg (197 lb)   SpO2 94%   BMI 31.80 kg/m²      Physical Exam   Constitutional: She appears well-developed and well-nourished.   Neck: No JVD present.   Cardiovascular: Normal rate and regular rhythm.    No murmur heard.  Pulmonary/Chest: Effort normal and breath sounds normal. She has no rales.   Abdominal: Soft. There is no tenderness.   Musculoskeletal: She exhibits no edema.     Laboratory from December 31: Sodium 138, potassium 3.8, BUN 25 and creatinine 0.71.    Transthoracic  Echo Report      Echocardiography Laboratory    CONCLUSIONS  No prior study is available for comparison.   Left ventricle is mildly dilated. Normal left ventricular wall   thickness. Low normal to mildly reduced left ventricular systolic   function. Left ventricular ejection fraction is visually estimated to   be 50 %. Poor endocardial definition in multiple views. Normal   diastolic function.    NICKIE TALLEY  Exam Date:         01/09/2019     Assessment:     1. ACC/AHA stage C systolic heart failure (HCC): LVEF 15% on echocardiography in October 2018     2. NYHA class 1 heart failure with reduced ejection fraction (HCC)     3. Essential hypertension, benign         Medical Decision Making:  Today's Assessment / Status / Plan:     Ms. Talley is clinically stable.  She has had significant improvement in her left ventricular function and her diastolic function is normal.  I feel that her dyspnea is probably due to poor physical conditioning.  We discussed a low level walking program.  She continues to refrain from methamphetamine use.  She will reduce her furosemide dosage to 20 " mg daily.  She will follow-up in about 3 months with a LIVIA [facility]  [address] [place] [address]  VIA Facsimile: [phone]

## 2019-06-18 RX ORDER — SPIRONOLACTONE 25 MG/1
12.5 TABLET ORAL DAILY
Qty: 45 TAB | Refills: 3 | Status: SHIPPED | OUTPATIENT
Start: 2019-06-18

## 2019-08-07 ENCOUNTER — OFFICE VISIT (OUTPATIENT)
Dept: CARDIOLOGY | Facility: MEDICAL CENTER | Age: 49
End: 2019-08-07
Payer: MEDICAID

## 2019-08-07 VITALS
HEART RATE: 70 BPM | WEIGHT: 185 LBS | DIASTOLIC BLOOD PRESSURE: 70 MMHG | BODY MASS INDEX: 30.82 KG/M2 | HEIGHT: 65 IN | SYSTOLIC BLOOD PRESSURE: 100 MMHG | OXYGEN SATURATION: 96 %

## 2019-08-07 DIAGNOSIS — I51.89 LEFT VENTRICULAR SYSTOLIC DYSFUNCTION, NYHA CLASS 2: ICD-10-CM

## 2019-08-07 DIAGNOSIS — I50.20 ACC/AHA STAGE C SYSTOLIC HEART FAILURE (HCC): ICD-10-CM

## 2019-08-07 DIAGNOSIS — I10 ESSENTIAL HYPERTENSION, BENIGN: ICD-10-CM

## 2019-08-07 PROCEDURE — 99214 OFFICE O/P EST MOD 30 MIN: CPT | Performed by: INTERNAL MEDICINE

## 2019-08-07 RX ORDER — FUROSEMIDE 20 MG/1
20 TABLET ORAL DAILY
Qty: 90 TAB | Refills: 3 | Status: SHIPPED | OUTPATIENT
Start: 2019-08-07

## 2019-08-07 NOTE — PROGRESS NOTES
Chief Complaint   Patient presents with   • CHF (Chronic)       Subjective:   Lisa Talley is a 48 y.o. female who presents today for follow-up of her congestive heart failure with a severely reduced ejection fraction.  She was hospitalized at the end of October in [place].  She was ultimately found to have an ejection fraction of about 15%.  She does have a history of methamphetamine use. Her ejection fraction improved to 50%.      She has noted dyspnea on exertion about 100 yards.  She has had no PND orthopnea.  She denies any palpitations or chest discomfort.  She occasionally notes some dependent edema.    She still not getting much in the way of aerobic conditioning.    Past Medical History:   Diagnosis Date   • Hypertension      Past Surgical History:   Procedure Laterality Date   • EMILIANO BY LAPAROSCOPY     • TUBAL COAGULATION LAPAROSCOPIC BILATERAL       Family History   Problem Relation Age of Onset   • Diabetes Father    • Hyperlipidemia Father    • Heart Attack Neg Hx      Social History     Socioeconomic History   • Marital status: Single     Spouse name: Not on file   • Number of children: Not on file   • Years of education: Not on file   • Highest education level: Not on file   Occupational History   • Not on file   Social Needs   • Financial resource strain: Not on file   • Food insecurity:     Worry: Not on file     Inability: Not on file   • Transportation needs:     Medical: Not on file     Non-medical: Not on file   Tobacco Use   • Smoking status: Never Smoker   • Smokeless tobacco: Never Used   Substance and Sexual Activity   • Alcohol use: No   • Drug use: No     Types: Methamphetamines   • Sexual activity: Not on file   Lifestyle   • Physical activity:     Days per week: Not on file     Minutes per session: Not on file   • Stress: Not on file   Relationships   • Social connections:     Talks on phone: Not on file     Gets together: Not on file     Attends Religion service: Not on file      "Active member of club or organization: Not on file     Attends meetings of clubs or organizations: Not on file     Relationship status: Not on file   • Intimate partner violence:     Fear of current or ex partner: Not on file     Emotionally abused: Not on file     Physically abused: Not on file     Forced sexual activity: Not on file   Other Topics Concern   • Not on file   Social History Narrative   • Not on file     No Known Allergies  Outpatient Encounter Medications as of 8/7/2019   Medication Sig Dispense Refill   • spironolactone (ALDACTONE) 25 MG Tab Take 0.5 Tabs by mouth every day. 45 Tab 3   • furosemide (LASIX) 20 MG Tab Take 1 Tab by mouth every day. 30 Tab 11   • metoprolol SR (TOPROL XL) 100 MG TABLET SR 24 HR Take 1 Tab by mouth every day. 30 Tab 11   • lisinopril (PRINIVIL) 10 MG Tab Take 10 mg by mouth every day.       No facility-administered encounter medications on file as of 8/7/2019.      ROS       Objective:   /70 (BP Location: Left arm, Patient Position: Sitting, BP Cuff Size: Adult)   Pulse 70   Ht 1.651 m (5' 5\")   Wt 83.9 kg (185 lb)   SpO2 96%   BMI 30.79 kg/m²     Physical Exam   Constitutional: She appears well-developed and well-nourished.   Neck: No JVD present.   Cardiovascular: Normal rate and regular rhythm.   No murmur heard.  Pulmonary/Chest: Effort normal and breath sounds normal. She has no rales.   Abdominal: Soft. There is no tenderness.   Musculoskeletal: She exhibits no edema.     Laboratory from December 31: Sodium 138, potassium 3.8, BUN 25 and creatinine 0.71.    Transthoracic  Echo Report      Echocardiography Laboratory    CONCLUSIONS  No prior study is available for comparison.   Left ventricle is mildly dilated. Normal left ventricular wall   thickness. Low normal to mildly reduced left ventricular systolic   function. Left ventricular ejection fraction is visually estimated to   be 50 %. Poor endocardial definition in multiple views. Normal   diastolic " function.    NICKIE TALLEY  Exam Date:         01/09/2019     Assessment:     1. ACC/AHA stage C systolic heart failure (HCC): LVEF 15% on echocardiography in October 2018, 50% 1/2019     2. Essential hypertension, benign     3. Left ventricular systolic dysfunction, NYHA class 2         Medical Decision Making:  Today's Assessment / Status / Plan:     Ms. Talley is clinically stable.  We again discussed walking at least 30 minutes 3 times a week.  Patient does walk significantly at work but is not really getting much in the way of aerobic conditioning.  Her blood pressure is under good control.  She continues to refrain from methamphetamine use.  She will follow-up in about 6 months with a repeat echocardiogram.

## 2019-08-14 DIAGNOSIS — I50.20 ACC/AHA STAGE C SYSTOLIC HEART FAILURE (HCC): ICD-10-CM

## 2019-08-14 DIAGNOSIS — I50.9 HEART FAILURE, NYHA CLASS 2 (HCC): ICD-10-CM

## 2019-08-14 RX ORDER — METOPROLOL SUCCINATE 100 MG/1
100 TABLET, EXTENDED RELEASE ORAL DAILY
Qty: 90 TAB | Refills: 3 | Status: SHIPPED | OUTPATIENT
Start: 2019-08-14

## 2019-08-19 ENCOUNTER — HOSPITAL ENCOUNTER (OUTPATIENT)
Dept: CARDIOLOGY | Facility: MEDICAL CENTER | Age: 49
End: 2019-08-19
Attending: INTERNAL MEDICINE
Payer: MEDICAID

## 2019-08-19 DIAGNOSIS — I50.20 ACC/AHA STAGE C SYSTOLIC HEART FAILURE (HCC): ICD-10-CM

## 2019-08-19 DIAGNOSIS — I10 ESSENTIAL HYPERTENSION, BENIGN: ICD-10-CM

## 2019-08-19 PROCEDURE — 93306 TTE W/DOPPLER COMPLETE: CPT | Mod: 26 | Performed by: INTERNAL MEDICINE

## 2019-08-19 PROCEDURE — 93306 TTE W/DOPPLER COMPLETE: CPT

## 2019-08-20 LAB
LV EJECT FRACT  99904: 65
LV EJECT FRACT MOD 2C 99903: 68.26
LV EJECT FRACT MOD 4C 99902: 63.53
LV EJECT FRACT MOD BP 99901: 64.21

## 2020-02-19 ENCOUNTER — TELEPHONE (OUTPATIENT)
Dept: CARDIOLOGY | Facility: MEDICAL CENTER | Age: 50
End: 2020-02-19

## 2020-02-19 NOTE — TELEPHONE ENCOUNTER
LM with patient's daughter. I asked if the patient would be able to complete non-fasting labs before upcoming appt. The patient's daughter stated that she would let her mother know.     I left appt information with the patient's daughter as well.     Appt with ROSY Sargent on 02/24/2020.